# Patient Record
Sex: FEMALE | URBAN - METROPOLITAN AREA
[De-identification: names, ages, dates, MRNs, and addresses within clinical notes are randomized per-mention and may not be internally consistent; named-entity substitution may affect disease eponyms.]

---

## 2018-05-29 NOTE — PATIENT DISCUSSION
Counseling: Not visually significant to proceed with surgery at this time. I have discussed continuing with current spectacles vs updating. Return to follow up as schedled or sooner if symptoms arise.

## 2018-12-04 NOTE — PATIENT DISCUSSION
Salzmann's Nodular Degeneration Counseling: The nature of the corneal scar or callus has been explained to the patient. Treatment options include artificial tears, mild topical steroids, or scraping / lamellar keratectomy of the callus or nodule. Treatment options may need to be considered if the patient has foreign body sensation, decreased vision, or upcoming cataract surgery. Infection is a risk of corneal scraping so the patient will need to be on topical antibiotic eye drops for several days. If treatment is deferred, the astigmatism which is caused by the nodule(s) may need to be followed with corneal topography.

## 2018-12-04 NOTE — PATIENT DISCUSSION
CATARACTS, OU - VISUALLY SIGNIFICANT. PATIENT INSTRUCTED TO TEST EYES WHILE DRIVING AT NIGHT AND DOING DAILY ACTIVITIES. FOLLOW.

## 2019-06-11 NOTE — PATIENT DISCUSSION
REFRACTION: PATIENT REQUESTS NEW REFRACTION THAT WAS COMPLETED TODAY. She wants only distance glasses - can't wear bifocals due to vertigo.

## 2020-06-17 ENCOUNTER — IMPORTED ENCOUNTER (OUTPATIENT)
Dept: URBAN - METROPOLITAN AREA CLINIC 9 | Facility: CLINIC | Age: 55
End: 2020-06-17

## 2020-09-01 NOTE — PATIENT DISCUSSION
Continue: prednisolone acetate (prednisolone acetate): drops,suspension: 1% 1 drop twice a day as directed into left eye 07-

## 2020-10-06 NOTE — PATIENT DISCUSSION
Stopped Today: prednisolone acetate (prednisolone acetate): drops,suspension: 1% 1 drop twice a day as directed into left eye 07-

## 2020-10-06 NOTE — PATIENT DISCUSSION
[id number]&nbsp;SN &nbsp; &nbsp; pfm

## 2020-10-06 NOTE — PATIENT DISCUSSION
"Patient is getting out of bed. Security has been called. Attempted to redirect the patient to get back in bed. Patient is walking around the ER trying to find the exits. The patient's niece is following the patient. Dr. Valladares attempted to assist the patient back to the room when she started swinging her arms in the air and screaming. Patient became verbally and physically aggressive. Four nurses and one ED tech were there to assist. The patient then swung her fist at the ED tech, Demetrius. Patient is assisted back in her room in the bed. The patient then started to scream racial slurs and calling nurses names such as "jacke." Patient is screaming, crying, being verbally and physically aggressive. The patient's niece is refusing to leave the patient's room. REJI  has been called. REJI had to escort the patient's niece outside of the room.   " UV Protection

## 2021-04-13 NOTE — PATIENT DISCUSSION
Noted PCO during the exam. Encouraged patient to analyze night time driving, glare and halos. Can proceed with a YAG in the future should symptoms become more troublesome.

## 2021-04-13 NOTE — PATIENT DISCUSSION
Increase use of UV protection and artificial tears. No surgical intervention recommend at this time. Document yearly pedro images.

## 2021-04-13 NOTE — PATIENT DISCUSSION
Prescribe preservative free artificial tears. Recommend use of fish oil supplements and daily warm compresses. Limit fast moving air to eyes. Patient can continue Genteal as needed.

## 2021-10-16 ASSESSMENT — VISUAL ACUITY
OS_CC: 20/20 SN
OD_CC: 20/20 SN

## 2021-10-16 ASSESSMENT — TONOMETRY
OS_IOP_MMHG: 16
OD_IOP_MMHG: 15
